# Patient Record
Sex: FEMALE | Race: BLACK OR AFRICAN AMERICAN | Employment: STUDENT | URBAN - METROPOLITAN AREA
[De-identification: names, ages, dates, MRNs, and addresses within clinical notes are randomized per-mention and may not be internally consistent; named-entity substitution may affect disease eponyms.]

---
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
## 2020-03-08 PROBLEM — R20.0 NUMBNESS AND TINGLING: Status: ACTIVE | Noted: 2020-03-08

## 2020-03-08 PROBLEM — R20.2 NUMBNESS AND TINGLING: Status: ACTIVE | Noted: 2020-03-08

## 2020-03-08 PROBLEM — M84.372A: Status: ACTIVE | Noted: 2020-03-08

## 2020-04-27 ENCOUNTER — HOSPITAL ENCOUNTER (OUTPATIENT)
Dept: SURGERY | Age: 20
Discharge: HOME | End: 2020-04-27

## 2020-04-29 VITALS — WEIGHT: 130 LBS | BODY MASS INDEX: 20.89 KG/M2 | HEIGHT: 66 IN

## 2020-04-29 RX ORDER — NORTRIPTYLINE HYDROCHLORIDE 25 MG/1
25 CAPSULE ORAL
COMMUNITY
End: 2020-07-09

## 2020-04-29 NOTE — PERIOP NOTES
Patient verified name&  . Order to obtain consent not currently found in EHR, however patient agrees with surgery posting. Pt verbalizes understanding of no visitor policy. Type 1B surgery,  assessment complete. Orders not yet received. Labs per surgeon: unknown  Labs per anesthesia protocol: none  EKG;s received from [address]; 19    Symptoms of tachycardia, irregular heart beat, seizures and dizziness. Pt saw cardiologist at [facility] 19. Pt was placed on cardiac monitor and had an ECHO with normal EF. Per Dr Jonatan Bazan notes, patient would be referred to neurologist. Pt and her mother state that they never received a referral. Patient also states hx of fever following 2 surgeries in [facility] 3 at [facility]. Pt states she was not instructed to be tested for MALIGNANT HYPERTHERMIA or told to be tested. Pt denies family hx of malignant hyperthermia. Pt 's mother states that now that she has been dx with Lupus by her GP that it is thought to be related. Left Msg requesting anesthesia records at [phone]. Discussed all of the above with Dr Eve Levi and received order for patient to be cleared by neurologist prior to surgery. Lisa Solis MA to Dr Lamont Scott and notified that pt must receive neurological clearance. Patient also notified. Patient answered medical/surgical history questions at their best of ability. All prior to admission medications reviewed with patient and documented in [facility]. Patient instructed to take ONLY THE FOLLOWING MEDICATIONS ON THE DAY OF SURGERY according to anesthesia guidelines with sips of water: none      Pt verbalizes understanding to [facility]: none    PT VERBALIZES UNDERSTANDING TO HOLD ALL VITAMINS AND SUPPLEMENTS 7 DAYS PRIOR TO SURGERY DATE (with exception to Renal Vitamins) and NSAIDS 5 DAYS PRIOR TO SURGERY DATE PER ANESTHESIA PROTOCOL.     Patient instructed on the following:  Arrive at [facility], time of arrival to be called the day before by 1700. NPO after midnight including gum, mints, tobacco and ice chips. Responsible adult must drive patient to the hospital, stay during surgery, and patient requires supervision 24 hours after anesthesia  Use antibacterial soap in shower the night before surgery and on the morning of surgery. Leave all valuables (money and jewelry) at home but bring insurance card and ID on DOS. Do not wear make-up, nail polish, lotions, cologne, perfumes, powders, or oil on skin. Patient teach back successful and patient demonstrates knowledge of instruction. If you do not receive a call with your arrival time by 5pm the business day prior to surgery, please call [phone].

## 2020-07-09 PROBLEM — M79.2 NEUROPATHIC PAIN: Status: ACTIVE | Noted: 2020-07-09

## 2020-07-09 PROBLEM — M21.372 FOOT DROP, LEFT FOOT: Status: ACTIVE | Noted: 2020-07-09

## 2020-07-09 PROBLEM — R53.1 WEAKNESS: Status: ACTIVE | Noted: 2020-07-09

## 2020-07-09 PROBLEM — Z79.899 ENCOUNTER FOR MEDICATION MANAGEMENT: Status: ACTIVE | Noted: 2020-07-09

## 2020-11-05 PROBLEM — M21.372 FOOT DROP, LEFT FOOT: Status: RESOLVED | Noted: 2020-07-09 | Resolved: 2020-11-05

## 2020-11-05 PROBLEM — R29.898 WEAKNESS OF FOOT, LEFT: Status: ACTIVE | Noted: 2020-07-09

## 2021-11-05 ENCOUNTER — HOSPITAL ENCOUNTER (OUTPATIENT)
Dept: PHYSICAL THERAPY | Age: 21
Discharge: HOME | End: 2021-11-05
Attending: ORTHOPAEDIC SURGERY
Payer: COMMERCIAL

## 2021-11-05 DIAGNOSIS — M54.32 SCIATICA OF LEFT SIDE: ICD-10-CM

## 2021-11-05 PROCEDURE — 97162 PT EVAL MOD COMPLEX 30 MIN: CPT

## 2021-11-05 NOTE — PROGRESS NOTES
León Casandra  : 2000  Payor: Katharine / Plan: [address] / Product Type: Managed Care Medicaid /  [address]  at   [address] [facility], [place] [address] [address] [facility]  [phone]   [phone]      OUTPATIENT PHYSICAL THERAPY: Daily Treatment Note 2021  Visit Count:  1    ICD-10: Treatment Diagnosis:   M54.5 Low Back Pain   M54.16 Lumbar Radiculopathy  R26.2 Difficulty in Walking, Not Elsewhere Classified  Z91.81 History of Falling     Precautions/Allergies:   Niacin and Penicillins     TREATMENT PLAN:  Effective Dates: 2021 TO 2/3/2022 (90 days). Frequency/Duration: 2 times a week for 90 Days      REASON FOR TREATMENT:  possible discogenic pain in lumbar spine that refers to L LE with extension preference. Neural tension testing and hypermobility testing positive, and pt will likely bennefit from physical therapy. Functional limitations reported at initial Eval: Bending down to pick things off the floor, stooping, lifting weights, R trunk rotation. Daily Note Subjective:     MEDICATIONS REVIEWED:  2021   TREATMENT:   (In addition to Assessment/Re-Assessment sessions the following treatments were rendered)    TREATMENT GRID: Exercises and activities per grid below to improve mobility, strength, balance, and overall function. Required minimal visual and verbal cues to promote proper body alignment and promote proper body posture. Progressed resistance, range and complexity of movement as indicated. Dx: LBP (and L LE) Date:  2021 Date:   Date:     Activity/Exercise Parameters Parameters Parameters                                                                 MANUAL THERAPY: Not performed apart from assessment. MODALITIES: Not performed. TREATMENT/SESSION ASSESSMENT:  León Guajardo verbalized understanding of role of PT and POC.     Education: Pt education for initial HEP safety, exercise frequency and duration, symptom control, mechanics, fall risk, and anatomy and physiology of present condition/healing time. RECOMMENDATIONS/INTENT FOR NEXT TREATMENT SESSION: Next visit will focus on advancements to more challenging activities. PAIN: Initial: 5/10 Post Session:  5/10         Total Treatment Billable Duration: Pt was billed for eval only this date.       Manual Therapy: (0 minutes)     Ther-Ex: (0 minutes)     Ther-Act: (0 minutes)     Neuro Re-ed (0 minutes)     Modalities: (0 minutes)  PT Patient Time In/Time Out  Time In: 1473  Time Out: 4823  Darrell Vickers PT, DPT    Future Appointments   Date Time Provider Cuca Ashley   12/8/2021 11:00 AM Gena Faustin MD [facility]   2/10/2022  3:00 PM Mike Meyers MD [address] [facility]       Visit Approval Visit # Therapist initials Date A NS / Cx < 24 hr >24 hr Cx Comments    1 RH 11/5/21 [x]  [] [] Initial evaluation       [] [] []        [] [] []        [] [] []        [] [] []        [] [] []        [] [] []        [] [] []        [] [] []        [] [] []        [] [] []        [] [] []        [] [] []        [] [] []        [] [] []        [] [] []        [] [] []        [] [] []
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
## 2022-03-02 ENCOUNTER — HOSPITAL ENCOUNTER (EMERGENCY)
Age: 22
Discharge: HOME | End: 2022-03-02
Attending: EMERGENCY MEDICINE
Payer: COMMERCIAL

## 2022-03-02 VITALS
OXYGEN SATURATION: 97 % | SYSTOLIC BLOOD PRESSURE: 104 MMHG | RESPIRATION RATE: 18 BRPM | HEIGHT: 66 IN | DIASTOLIC BLOOD PRESSURE: 67 MMHG | HEART RATE: 77 BPM | BODY MASS INDEX: 24.91 KG/M2 | WEIGHT: 155 LBS | TEMPERATURE: 98.4 F

## 2022-03-02 DIAGNOSIS — R56.9 SEIZURE-LIKE ACTIVITY (HCC): ICD-10-CM

## 2022-03-02 DIAGNOSIS — N30.00 ACUTE CYSTITIS WITHOUT HEMATURIA: Primary | ICD-10-CM

## 2022-03-02 LAB
ALBUMIN SERPL-MCNC: 3.7 G/DL (ref 3.5–5)
ALBUMIN/GLOB SERPL: 0.8 {RATIO} (ref 1.2–3.5)
ALP SERPL-CCNC: 82 U/L (ref 50–130)
ALT SERPL-CCNC: 29 U/L (ref 12–65)
ANION GAP SERPL CALC-SCNC: 8 MMOL/L (ref 7–16)
AST SERPL-CCNC: 15 U/L (ref 15–37)
ATRIAL RATE: 62 BPM
BACTERIA URNS QL MICRO: 0 /HPF
BASOPHILS # BLD: 0.1 K/UL (ref 0–0.2)
BASOPHILS NFR BLD: 0 % (ref 0–2)
BILIRUB SERPL-MCNC: 0.2 MG/DL (ref 0.2–1.1)
BUN SERPL-MCNC: 10 MG/DL (ref 6–23)
CALCIUM SERPL-MCNC: 9 MG/DL (ref 8.3–10.4)
CALCULATED P AXIS, ECG09: 41 DEGREES
CALCULATED R AXIS, ECG10: 68 DEGREES
CALCULATED T AXIS, ECG11: 61 DEGREES
CASTS URNS QL MICRO: NORMAL /LPF
CHLORIDE SERPL-SCNC: 103 MMOL/L (ref 98–107)
CO2 SERPL-SCNC: 26 MMOL/L (ref 21–32)
CREAT SERPL-MCNC: 0.78 MG/DL (ref 0.6–1)
DIAGNOSIS, 93000: NORMAL
DIFFERENTIAL METHOD BLD: ABNORMAL
EOSINOPHIL # BLD: 0 K/UL (ref 0–0.8)
EOSINOPHIL NFR BLD: 0 % (ref 0.5–7.8)
EPI CELLS #/AREA URNS HPF: NORMAL /HPF
ERYTHROCYTE [DISTWIDTH] IN BLOOD BY AUTOMATED COUNT: 12.3 % (ref 11.9–14.6)
GLOBULIN SER CALC-MCNC: 4.6 G/DL (ref 2.3–3.5)
GLUCOSE SERPL-MCNC: 117 MG/DL (ref 65–100)
HCG UR QL: NEGATIVE
HCT VFR BLD AUTO: 37.1 % (ref 35.8–46.3)
HGB BLD-MCNC: 12.4 G/DL (ref 11.7–15.4)
IMM GRANULOCYTES # BLD AUTO: 0.1 K/UL (ref 0–0.5)
IMM GRANULOCYTES NFR BLD AUTO: 0 % (ref 0–5)
LYMPHOCYTES # BLD: 1.8 K/UL (ref 0.5–4.6)
LYMPHOCYTES NFR BLD: 12 % (ref 13–44)
MAGNESIUM SERPL-MCNC: 2.2 MG/DL (ref 1.8–2.4)
MCH RBC QN AUTO: 28.9 PG (ref 26.1–32.9)
MCHC RBC AUTO-ENTMCNC: 33.4 G/DL (ref 31.4–35)
MCV RBC AUTO: 86.5 FL (ref 79.6–97.8)
MONOCYTES # BLD: 0.4 K/UL (ref 0.1–1.3)
MONOCYTES NFR BLD: 3 % (ref 4–12)
NEUTS SEG # BLD: 12.8 K/UL (ref 1.7–8.2)
NEUTS SEG NFR BLD: 85 % (ref 43–78)
NRBC # BLD: 0 K/UL (ref 0–0.2)
P-R INTERVAL, ECG05: 142 MS
PLATELET # BLD AUTO: 416 K/UL (ref 150–450)
PMV BLD AUTO: 9.4 FL (ref 9.4–12.3)
POTASSIUM SERPL-SCNC: 3.7 MMOL/L (ref 3.5–5.1)
PROT SERPL-MCNC: 8.3 G/DL (ref 6.3–8.2)
Q-T INTERVAL, ECG07: 406 MS
QRS DURATION, ECG06: 72 MS
QTC CALCULATION (BEZET), ECG08: 412 MS
RBC # BLD AUTO: 4.29 M/UL (ref 4.05–5.2)
RBC #/AREA URNS HPF: NORMAL /HPF
SODIUM SERPL-SCNC: 137 MMOL/L (ref 136–145)
VENTRICULAR RATE, ECG03: 62 BPM
WBC # BLD AUTO: 15.1 K/UL (ref 4.3–11.1)
WBC URNS QL MICRO: NORMAL /HPF

## 2022-03-02 PROCEDURE — 81003 URINALYSIS AUTO W/O SCOPE: CPT

## 2022-03-02 PROCEDURE — 85025 COMPLETE CBC W/AUTO DIFF WBC: CPT

## 2022-03-02 PROCEDURE — 83735 ASSAY OF MAGNESIUM: CPT

## 2022-03-02 PROCEDURE — 81015 MICROSCOPIC EXAM OF URINE: CPT

## 2022-03-02 PROCEDURE — 99284 EMERGENCY DEPT VISIT MOD MDM: CPT

## 2022-03-02 PROCEDURE — 93005 ELECTROCARDIOGRAM TRACING: CPT | Performed by: EMERGENCY MEDICINE

## 2022-03-02 PROCEDURE — 81025 URINE PREGNANCY TEST: CPT

## 2022-03-02 PROCEDURE — 80053 COMPREHEN METABOLIC PANEL: CPT

## 2022-03-02 RX ORDER — SODIUM CHLORIDE 0.9 % (FLUSH) 0.9 %
5-40 SYRINGE (ML) INJECTION AS NEEDED
Status: DISCONTINUED | OUTPATIENT
Start: 2022-03-02 | End: 2022-03-02 | Stop reason: HOSPADM

## 2022-03-02 RX ORDER — SODIUM CHLORIDE 0.9 % (FLUSH) 0.9 %
5-40 SYRINGE (ML) INJECTION EVERY 8 HOURS
Status: DISCONTINUED | OUTPATIENT
Start: 2022-03-02 | End: 2022-03-02 | Stop reason: HOSPADM

## 2022-03-02 RX ORDER — SULFAMETHOXAZOLE AND TRIMETHOPRIM 800; 160 MG/1; MG/1
1 TABLET ORAL 2 TIMES DAILY
Qty: 14 TABLET | Refills: 0 | Status: SHIPPED | OUTPATIENT
Start: 2022-03-02 | End: 2022-03-09

## 2022-03-02 NOTE — ED NOTES
I have reviewed discharge instructions with the patient. The patient verbalized understanding. Patient left ED via Discharge Method: ambulatory to Home with (girlfriend). Opportunity for questions and clarification provided. Patient given 1 scripts. To continue your aftercare when you leave the hospital, you may receive an automated call from our care team to check in on how you are doing. This is a free service and part of our promise to provide the best care and service to meet your aftercare needs.  If you have questions, or wish to unsubscribe from this service please call [phone]. Thank you for Choosing our [facility] Emergency Department.

## 2022-03-02 NOTE — DISCHARGE INSTRUCTIONS
Please return with any fevers, vomiting, difficulty breathing, worsening symptoms, or additional concerns. You can stop taking your other antibiotic and instead substitute the Bactrim. Please follow-up with your primary care doctor for further evaluation.

## 2022-03-02 NOTE — ED TRIAGE NOTES
Pt had multiple seizures starting @ 502, family states she had them back to back with only a few seconds between lasting for up to 20 minutes. Pt c/o head pain in the back of her head, family states she did not hit her head on anything. Pt has a hx of seizures, on Zonisamide  And states she has not missed a dose. Masked in triage.
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
## 2022-03-19 PROBLEM — M84.372A: Status: ACTIVE | Noted: 2020-03-08

## 2022-03-19 PROBLEM — R29.898 WEAKNESS OF FOOT, LEFT: Status: ACTIVE | Noted: 2020-07-09

## 2022-03-19 PROBLEM — R20.0 NUMBNESS AND TINGLING: Status: ACTIVE | Noted: 2020-03-08

## 2022-03-19 PROBLEM — R20.2 NUMBNESS AND TINGLING: Status: ACTIVE | Noted: 2020-03-08

## 2022-03-19 PROBLEM — Z79.899 ENCOUNTER FOR MEDICATION MANAGEMENT: Status: ACTIVE | Noted: 2020-07-09

## 2022-03-20 PROBLEM — M79.2 NEUROPATHIC PAIN: Status: ACTIVE | Noted: 2020-07-09

## 2022-06-09 ENCOUNTER — OFFICE VISIT (OUTPATIENT)
Dept: RHEUMATOLOGY | Age: 22
End: 2022-06-09
Payer: COMMERCIAL

## 2022-06-09 VITALS
RESPIRATION RATE: 16 BRPM | HEIGHT: 66 IN | SYSTOLIC BLOOD PRESSURE: 102 MMHG | HEART RATE: 76 BPM | WEIGHT: 165.3 LBS | BODY MASS INDEX: 26.57 KG/M2 | DIASTOLIC BLOOD PRESSURE: 64 MMHG

## 2022-06-09 DIAGNOSIS — M35.9 AUTOIMMUNE DISEASE (HCC): Primary | ICD-10-CM

## 2022-06-09 PROCEDURE — 99214 OFFICE O/P EST MOD 30 MIN: CPT | Performed by: INTERNAL MEDICINE

## 2022-06-09 RX ORDER — HYDROXYCHLOROQUINE SULFATE 200 MG/1
200 TABLET, FILM COATED ORAL 2 TIMES DAILY
Qty: 60 TABLET | Refills: 12 | Status: SHIPPED | OUTPATIENT
Start: 2022-06-09

## 2022-06-09 RX ORDER — PREDNISONE 10 MG/1
10 TABLET ORAL 2 TIMES DAILY
Qty: 60 TABLET | Refills: 0 | Status: SHIPPED | OUTPATIENT
Start: 2022-06-09 | End: 2022-07-28
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
## 2022-07-07 DIAGNOSIS — M35.9 AUTOIMMUNE DISEASE (HCC): Primary | ICD-10-CM

## 2022-07-07 RX ORDER — PREDNISONE 10 MG/1
TABLET ORAL
Qty: 60 TABLET | Refills: 0 | OUTPATIENT
Start: 2022-07-07

## 2022-07-08 ENCOUNTER — OFFICE VISIT (OUTPATIENT)
Dept: ORTHOPEDIC SURGERY | Age: 22
End: 2022-07-08

## 2022-07-08 DIAGNOSIS — S82.65XA CLOSED NONDISPLACED FRACTURE OF LATERAL MALLEOLUS OF LEFT FIBULA, INITIAL ENCOUNTER: Primary | ICD-10-CM

## 2022-07-08 DIAGNOSIS — M25.572 CHRONIC PAIN OF LEFT ANKLE: ICD-10-CM

## 2022-07-08 DIAGNOSIS — G89.29 CHRONIC PAIN OF LEFT ANKLE: ICD-10-CM

## 2022-07-08 PROCEDURE — 99213 OFFICE O/P EST LOW 20 MIN: CPT | Performed by: ORTHOPAEDIC SURGERY

## 2022-07-08 PROCEDURE — L4360 PNEUMAT WALKING BOOT PRE CST: HCPCS | Performed by: ORTHOPAEDIC SURGERY
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
## 2022-07-08 NOTE — PROGRESS NOTES
The patient was prescribed a walker boot for the patient's left foot. The patient wears a size 9 shoe and I fitted them with a M size boot. The patient was fitted and instructed on the use of prescribed walker boot. I explained how to fit themselves and that the plastic flexible piece should always be on the front of the boot and secured by the Velcro straps on top. The air bladder in the boot was adjusted according to proper fit and comfort. The patient walked a short distance and acknowledged satisfaction with current fit. I also explained that they need a heel lift or a higher heeled shoe for the uninvolved LE to help normalize gait and avoid excessive low back stress/strain due to leg length inequality created from walker boot. Patient read and signed documenting they understand and agree to [facility]'s current DME return policy.

## 2022-07-15 ENCOUNTER — TELEPHONE (OUTPATIENT)
Dept: ORTHOPEDIC SURGERY | Age: 22
End: 2022-07-15

## 2022-07-18 ENCOUNTER — TELEPHONE (OUTPATIENT)
Dept: ORTHOPEDIC SURGERY | Age: 22
End: 2022-07-18

## 2022-07-18 NOTE — TELEPHONE ENCOUNTER
Patient is calling back from Friday because her lt foot is in more pain than it was.  Wants to talk with someone

## 2022-07-19 ENCOUNTER — TELEPHONE (OUTPATIENT)
Dept: ORTHOPEDIC SURGERY | Age: 22
End: 2022-07-19

## 2022-07-19 NOTE — TELEPHONE ENCOUNTER
Spoke to patient and informed her per Dr. Roro Amaya she needs to wear her walker boot full time except for sleeping. Patient voiced understanding.

## 2022-07-28 ENCOUNTER — OFFICE VISIT (OUTPATIENT)
Dept: RHEUMATOLOGY | Age: 22
End: 2022-07-28
Payer: COMMERCIAL

## 2022-07-28 VITALS
RESPIRATION RATE: 14 BRPM | HEIGHT: 66 IN | BODY MASS INDEX: 26.9 KG/M2 | HEART RATE: 68 BPM | DIASTOLIC BLOOD PRESSURE: 60 MMHG | SYSTOLIC BLOOD PRESSURE: 92 MMHG | WEIGHT: 167.4 LBS

## 2022-07-28 DIAGNOSIS — M35.9 AUTOIMMUNE DISEASE (HCC): Primary | ICD-10-CM

## 2022-07-28 PROCEDURE — 99214 OFFICE O/P EST MOD 30 MIN: CPT | Performed by: INTERNAL MEDICINE

## 2022-07-28 RX ORDER — PREDNISONE 10 MG/1
10 TABLET ORAL DAILY
Qty: 30 TABLET | Refills: 1 | Status: SHIPPED | OUTPATIENT
Start: 2022-07-28
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
## 2022-08-19 ENCOUNTER — OFFICE VISIT (OUTPATIENT)
Dept: ORTHOPEDIC SURGERY | Age: 22
End: 2022-08-19
Payer: COMMERCIAL

## 2022-08-19 DIAGNOSIS — S82.65XA CLOSED NONDISPLACED FRACTURE OF LATERAL MALLEOLUS OF LEFT FIBULA, INITIAL ENCOUNTER: Primary | ICD-10-CM

## 2022-08-19 PROCEDURE — 99213 OFFICE O/P EST LOW 20 MIN: CPT | Performed by: ORTHOPAEDIC SURGERY

## 2022-08-19 NOTE — PROGRESS NOTES
Name: Maria Elena Hanson  YOB: 2000  Gender: female  MRN: [id number]    Summary:     Left healing lateral malleolus fracture     CC: Follow-up (Left ankle x-rays taken in office today)       HPI: Maria Elena Hanson is a 25 y.o. female who presents with Follow-up (Left ankle x-rays taken in office today)  . Patient presents back to the office today with some continued complaints of pain and swelling although overall better from wearing her walker boot. History was obtained by Patient     ROS/Meds/PSH/PMH/FH/SH: I personally reviewed the patients standard intake form. Below are the pertinents    Tobacco:  reports that she has never smoked. She has never used smokeless tobacco.  Diabetes: None      Physical Examination:  Exam of the left ankle shows a small swelling and tenderness to palpation over the medial and lateral malleolus. Imaging:   I independently interpreted XR taken today  Left ankle XR: AP, Lateral, Oblique views     ICD-10-CM    1. Closed nondisplaced fracture of lateral malleolus of left fibula, initial encounter  S82.65XA XR ANKLE LEFT (MIN 3 VIEWS)         Report: AP, lateral, oblique x-ray of the left ankle demonstrates healing fracture    Impression: Healing fracture   Kiah Lewis III, MD           Assessment:   Left healing fibular fracture    Treatment Plan:   3 This is an acute, uncomplicated illness/injury  Treatment at this time: Physical Therapy  Studies ordered: NO XR needed @ Next Visit    Weight-bearing status: WBAT        Return to work/work restrictions: none  No medications given    She can wean out of her walker boot and start a home exercise program as provided today and return in about 6 weeks if needed.

## 2022-09-15 ENCOUNTER — OFFICE VISIT (OUTPATIENT)
Dept: RHEUMATOLOGY | Age: 22
End: 2022-09-15
Payer: COMMERCIAL

## 2022-09-15 VITALS — SYSTOLIC BLOOD PRESSURE: 110 MMHG | WEIGHT: 165.2 LBS | DIASTOLIC BLOOD PRESSURE: 70 MMHG | BODY MASS INDEX: 26.66 KG/M2

## 2022-09-15 DIAGNOSIS — M35.9 AUTOIMMUNE DISEASE (HCC): Primary | ICD-10-CM

## 2022-09-15 LAB
ALBUMIN SERPL-MCNC: 4 G/DL (ref 3.5–5)
ALBUMIN/GLOB SERPL: 1 {RATIO} (ref 1.2–3.5)
ALP SERPL-CCNC: 62 U/L (ref 50–136)
ALT SERPL-CCNC: 14 U/L (ref 12–65)
ANION GAP SERPL CALC-SCNC: 6 MMOL/L (ref 4–13)
AST SERPL-CCNC: 16 U/L (ref 15–37)
BASOPHILS # BLD: 0 K/UL (ref 0–0.2)
BASOPHILS NFR BLD: 1 % (ref 0–2)
BILIRUB SERPL-MCNC: 0.6 MG/DL (ref 0.2–1.1)
BUN SERPL-MCNC: 8 MG/DL (ref 6–23)
CALCIUM SERPL-MCNC: 9.1 MG/DL (ref 8.3–10.4)
CHLORIDE SERPL-SCNC: 109 MMOL/L (ref 101–110)
CO2 SERPL-SCNC: 25 MMOL/L (ref 21–32)
CREAT SERPL-MCNC: 0.8 MG/DL (ref 0.6–1)
DIFFERENTIAL METHOD BLD: NORMAL
EOSINOPHIL # BLD: 0 K/UL (ref 0–0.8)
EOSINOPHIL NFR BLD: 1 % (ref 0.5–7.8)
ERYTHROCYTE [DISTWIDTH] IN BLOOD BY AUTOMATED COUNT: 13.1 % (ref 11.9–14.6)
GLOBULIN SER CALC-MCNC: 3.9 G/DL (ref 2.3–3.5)
GLUCOSE SERPL-MCNC: 93 MG/DL (ref 65–100)
HCT VFR BLD AUTO: 37.4 % (ref 35.8–46.3)
HGB BLD-MCNC: 12.3 G/DL (ref 11.7–15.4)
IMM GRANULOCYTES # BLD AUTO: 0 K/UL (ref 0–0.5)
IMM GRANULOCYTES NFR BLD AUTO: 0 % (ref 0–5)
LYMPHOCYTES # BLD: 1.2 K/UL (ref 0.5–4.6)
LYMPHOCYTES NFR BLD: 29 % (ref 13–44)
MCH RBC QN AUTO: 29.1 PG (ref 26.1–32.9)
MCHC RBC AUTO-ENTMCNC: 32.9 G/DL (ref 31.4–35)
MCV RBC AUTO: 88.6 FL (ref 79.6–97.8)
MONOCYTES # BLD: 0.3 K/UL (ref 0.1–1.3)
MONOCYTES NFR BLD: 7 % (ref 4–12)
NEUTS SEG # BLD: 2.8 K/UL (ref 1.7–8.2)
NEUTS SEG NFR BLD: 62 % (ref 43–78)
NRBC # BLD: 0 K/UL (ref 0–0.2)
PLATELET # BLD AUTO: 275 K/UL (ref 150–450)
PLATELET COMMENT: NORMAL
PMV BLD AUTO: 10.6 FL (ref 9.4–12.3)
POTASSIUM SERPL-SCNC: 3.9 MMOL/L (ref 3.5–5.1)
PROT SERPL-MCNC: 7.9 G/DL (ref 6.3–8.2)
RBC # BLD AUTO: 4.22 M/UL (ref 4.05–5.2)
RBC MORPH BLD: NORMAL
SODIUM SERPL-SCNC: 140 MMOL/L (ref 136–145)
WBC # BLD AUTO: 4.3 K/UL (ref 4.3–11.1)
WBC MORPH BLD: NORMAL

## 2022-09-15 PROCEDURE — 99214 OFFICE O/P EST MOD 30 MIN: CPT | Performed by: INTERNAL MEDICINE
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
## 2022-09-17 LAB
C3 SERPL-MCNC: 70 MG/DL (ref 82–167)
C4 SERPL-MCNC: <2 MG/DL (ref 12–38)
DSDNA AB SER-ACNC: 80 IU/ML (ref 0–9)

## 2022-10-05 ENCOUNTER — OFFICE VISIT (OUTPATIENT)
Dept: ORTHOPEDIC SURGERY | Age: 22
End: 2022-10-05
Payer: COMMERCIAL

## 2022-10-05 DIAGNOSIS — M84.372A STRESS FRACTURE OF LEFT ANKLE, INITIAL ENCOUNTER: Primary | ICD-10-CM

## 2022-10-05 PROCEDURE — 99213 OFFICE O/P EST LOW 20 MIN: CPT | Performed by: ORTHOPAEDIC SURGERY

## 2022-10-05 NOTE — PROGRESS NOTES
Name: Esther Manley  YOB: 2000  Gender: female  MRN: [id number]    Summary:   Left resolving ankle stress fracture       CC: Ankle Pain (Left ankle f/u)       HPI: Esther Manley is a 25 y.o. female who presents with Ankle Pain (Left ankle f/u)  . Patient returns back to the office today with continued complaints of left ankle pain but is gotten somewhat better. She is now been diagnosed with systemic lupus. She is on hydroxychloroquine for this now. History was obtained by Patient     ROS/Meds/PSH/PMH/FH/SH: I personally reviewed the patients standard intake form. Below are the pertinents    Tobacco:  reports that she has never smoked. She has never used smokeless tobacco.  Diabetes: None      Physical Examination:  Exam of the left foot and ankle shows diminished swelling and tenderness to palpation globally at the ankle joint. She has expected swelling. Imaging:   No imaging reviewed          Assessment:   Left resolving stress fracture of the ankle    Treatment Plan:   3 This is stable chronic illness/condition  Treatment at this time: Time with no intervention  Studies ordered: NO XR needed @ Next Visit    Weight-bearing status: WBAT        Return to work/work restrictions: none  No medications given    The pain appears to be back to her baseline now. The swelling is certainly better. I am optimistic that appropriate oral treatment for the lupus will also help some of her joint pain. She can return as needed.

## 2023-01-12 ENCOUNTER — OFFICE VISIT (OUTPATIENT)
Dept: RHEUMATOLOGY | Age: 23
End: 2023-01-12
Payer: COMMERCIAL

## 2023-01-12 VITALS
DIASTOLIC BLOOD PRESSURE: 82 MMHG | SYSTOLIC BLOOD PRESSURE: 118 MMHG | RESPIRATION RATE: 18 BRPM | BODY MASS INDEX: 27.48 KG/M2 | HEIGHT: 66 IN | HEART RATE: 88 BPM | WEIGHT: 171 LBS

## 2023-01-12 DIAGNOSIS — M35.9 AUTOIMMUNE DISEASE (HCC): Primary | ICD-10-CM

## 2023-01-12 PROCEDURE — 99214 OFFICE O/P EST MOD 30 MIN: CPT | Performed by: INTERNAL MEDICINE

## 2023-01-12 RX ORDER — GABAPENTIN 300 MG/1
900 CAPSULE ORAL 2 TIMES DAILY
COMMUNITY
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
## 2023-04-21 PROCEDURE — 96375 TX/PRO/DX INJ NEW DRUG ADDON: CPT

## 2023-04-21 PROCEDURE — 99284 EMERGENCY DEPT VISIT MOD MDM: CPT

## 2023-04-21 PROCEDURE — 96374 THER/PROPH/DIAG INJ IV PUSH: CPT

## 2023-04-22 ENCOUNTER — HOSPITAL ENCOUNTER (EMERGENCY)
Age: 23
Discharge: HOME | End: 2023-04-22
Attending: EMERGENCY MEDICINE
Payer: COMMERCIAL

## 2023-04-22 ENCOUNTER — APPOINTMENT (OUTPATIENT)
Dept: CT IMAGING | Age: 23
End: 2023-04-22
Payer: COMMERCIAL

## 2023-04-22 VITALS
SYSTOLIC BLOOD PRESSURE: 106 MMHG | OXYGEN SATURATION: 100 % | DIASTOLIC BLOOD PRESSURE: 71 MMHG | WEIGHT: 158 LBS | HEART RATE: 80 BPM | TEMPERATURE: 98 F | RESPIRATION RATE: 16 BRPM | BODY MASS INDEX: 25.39 KG/M2 | HEIGHT: 66 IN

## 2023-04-22 DIAGNOSIS — G43.109 MIGRAINE WITH AURA AND WITHOUT STATUS MIGRAINOSUS, NOT INTRACTABLE: Primary | ICD-10-CM

## 2023-04-22 PROCEDURE — 2580000003 HC RX 258: Performed by: EMERGENCY MEDICINE

## 2023-04-22 PROCEDURE — 70450 CT HEAD/BRAIN W/O DYE: CPT

## 2023-04-22 PROCEDURE — 6360000002 HC RX W HCPCS: Performed by: EMERGENCY MEDICINE

## 2023-04-22 RX ORDER — 0.9 % SODIUM CHLORIDE 0.9 %
1000 INTRAVENOUS SOLUTION INTRAVENOUS ONCE
Status: COMPLETED | OUTPATIENT
Start: 2023-04-22 | End: 2023-04-22

## 2023-04-22 RX ORDER — DIPHENHYDRAMINE HYDROCHLORIDE 50 MG/ML
25 INJECTION INTRAMUSCULAR; INTRAVENOUS
Status: COMPLETED | OUTPATIENT
Start: 2023-04-22 | End: 2023-04-22

## 2023-04-22 RX ORDER — DEXAMETHASONE SODIUM PHOSPHATE 10 MG/ML
10 INJECTION INTRAMUSCULAR; INTRAVENOUS
Status: COMPLETED | OUTPATIENT
Start: 2023-04-22 | End: 2023-04-22

## 2023-04-22 RX ORDER — KETOROLAC TROMETHAMINE 30 MG/ML
30 INJECTION, SOLUTION INTRAMUSCULAR; INTRAVENOUS ONCE
Status: COMPLETED | OUTPATIENT
Start: 2023-04-22 | End: 2023-04-22

## 2023-04-22 RX ORDER — PROCHLORPERAZINE EDISYLATE 5 MG/ML
10 INJECTION INTRAMUSCULAR; INTRAVENOUS
Status: COMPLETED | OUTPATIENT
Start: 2023-04-22 | End: 2023-04-22

## 2023-04-22 RX ADMIN — PROCHLORPERAZINE EDISYLATE 10 MG: 5 INJECTION INTRAMUSCULAR; INTRAVENOUS at 02:17

## 2023-04-22 RX ADMIN — DEXAMETHASONE SODIUM PHOSPHATE 10 MG: 10 INJECTION INTRAMUSCULAR; INTRAVENOUS at 02:18

## 2023-04-22 RX ADMIN — SODIUM CHLORIDE 1000 ML: 9 INJECTION, SOLUTION INTRAVENOUS at 02:22

## 2023-04-22 RX ADMIN — KETOROLAC TROMETHAMINE 30 MG: 30 INJECTION, SOLUTION INTRAMUSCULAR at 02:18

## 2023-04-22 RX ADMIN — DIPHENHYDRAMINE HYDROCHLORIDE 25 MG: 50 INJECTION, SOLUTION INTRAMUSCULAR; INTRAVENOUS at 02:17

## 2023-04-22 ASSESSMENT — PAIN DESCRIPTION - LOCATION
LOCATION: HEAD
LOCATION: HEAD

## 2023-04-22 ASSESSMENT — ENCOUNTER SYMPTOMS
PHOTOPHOBIA: 1
GASTROINTESTINAL NEGATIVE: 1
RESPIRATORY NEGATIVE: 1

## 2023-04-22 ASSESSMENT — PAIN SCALES - GENERAL
PAINLEVEL_OUTOF10: 10
PAINLEVEL_OUTOF10: 7

## 2023-04-22 ASSESSMENT — LIFESTYLE VARIABLES
HOW MANY STANDARD DRINKS CONTAINING ALCOHOL DO YOU HAVE ON A TYPICAL DAY: PATIENT DOES NOT DRINK
HOW OFTEN DO YOU HAVE A DRINK CONTAINING ALCOHOL: NEVER

## 2023-04-22 ASSESSMENT — PAIN - FUNCTIONAL ASSESSMENT: PAIN_FUNCTIONAL_ASSESSMENT: 0-10

## 2023-04-22 NOTE — ED TRIAGE NOTES
Pt ambulated to triage. Pt presenting with complaints of headaches, dizziness, and nausea. Pt denies vomiting. Pt states that she was coming to visit her mother and has developed an excruciating headache and is experiencing visual changes. Pt describes her visual changes as \"you know when you are looking at a really bright light and then look away and can still see a bright orb. \"     Breathing even and unlabored upon triage, no active signs of distress, vitals stable.

## 2023-04-22 NOTE — ED NOTES
I have reviewed discharge instructions with the patient. The patient verbalized understanding. Patient left ED via Discharge Method: ambulatory to Home with self/fam.   Opportunity for questions and clarification provided. Patient given 0 scripts. To continue your aftercare when you leave the hospital, you may receive an automated call from our care team to check in on how you are doing. This is a free service and part of our promise to provide the best care and service to meet your aftercare needs.  If you have questions, or wish to unsubscribe from this service please call [phone]. Thank you for Choosing our [facility] Emergency Department.         Dorean Boeck, RN  04/22/23 8172

## 2023-04-22 NOTE — ED PROVIDER NOTES
negative. Physical Exam     Vitals signs and nursing note reviewed:  Patient Vitals for the past 4 hrs:   Resp   04/22/23 0300 16          Physical Exam  Vitals and nursing note reviewed. Constitutional:       General: She is in acute distress (Appears uncomfortable). Appearance: Normal appearance. HENT:      Head: Normocephalic and atraumatic. Mouth/Throat:      Mouth: Mucous membranes are moist.   Eyes:      Extraocular Movements: Extraocular movements intact. Pupils: Pupils are equal, round, and reactive to light. Cardiovascular:      Rate and Rhythm: Normal rate and regular rhythm. Pulses: Normal pulses. Heart sounds: Normal heart sounds. Pulmonary:      Effort: Pulmonary effort is normal.      Breath sounds: Normal breath sounds. Abdominal:      Palpations: Abdomen is soft. Tenderness: There is no abdominal tenderness. There is no guarding or rebound. Musculoskeletal:         General: Normal range of motion. Cervical back: Normal range of motion. No tenderness. Skin:     General: Skin is warm and dry. Neurological:      Mental Status: She is alert and oriented to person, place, and time. Psychiatric:         Mood and Affect: Mood normal.         Behavior: Behavior normal.         Thought Content:  Thought content normal.         Judgment: Judgment normal.        Procedures     Procedures    Orders Placed This Encounter   Procedures    CT HEAD WO CONTRAST    POC PREGNANCY UR-QUAL    Saline lock IV        Medications   0.9 % sodium chloride bolus (0 mLs IntraVENous Stopped 4/22/23 0302)   ketorolac (TORADOL) injection 30 mg (30 mg IntraVENous Given 4/22/23 0218)   prochlorperazine (COMPAZINE) injection 10 mg (10 mg IntraVENous Given 4/22/23 0217)   diphenhydrAMINE (BENADRYL) injection 25 mg (25 mg IntraVENous Given 4/22/23 0217)   dexamethasone (DECADRON) injection 10 mg (10 mg IntraVENous Given 4/22/23 0218)       Discharge Medication List as of 4/22/2023

## 2023-05-11 ENCOUNTER — TELEPHONE (OUTPATIENT)
Dept: ORTHOPEDIC SURGERY | Age: 23
End: 2023-05-11

## 2023-05-11 NOTE — TELEPHONE ENCOUNTER
Spoke with pt she c/o of increased ankle pain so I made an appt for 5/15 @ 1:00 aware ES office and we can xray her left ankle at that time.  She will wear her boot until appt

## 2023-05-15 ENCOUNTER — OFFICE VISIT (OUTPATIENT)
Dept: ORTHOPEDIC SURGERY | Age: 23
End: 2023-05-15

## 2023-05-15 DIAGNOSIS — M19.079 ARTHRITIS OF SUBTALAR JOINT: ICD-10-CM

## 2023-05-15 DIAGNOSIS — M25.572 CHRONIC PAIN OF LEFT ANKLE: Primary | ICD-10-CM

## 2023-05-15 DIAGNOSIS — G89.29 CHRONIC PAIN OF LEFT ANKLE: Primary | ICD-10-CM

## 2023-05-15 RX ORDER — METHYLPREDNISOLONE 4 MG/1
TABLET ORAL
Qty: 1 KIT | Refills: 1 | Status: SHIPPED | OUTPATIENT
Start: 2023-05-15 | End: 2023-05-21

## 2023-05-15 RX ORDER — METHYLPREDNISOLONE ACETATE 40 MG/ML
40 INJECTION, SUSPENSION INTRA-ARTICULAR; INTRALESIONAL; INTRAMUSCULAR; SOFT TISSUE ONCE
Status: COMPLETED | OUTPATIENT
Start: 2023-05-15 | End: 2023-05-15

## 2023-05-15 RX ORDER — MELOXICAM 15 MG/1
15 TABLET ORAL DAILY
Qty: 30 TABLET | Refills: 3 | Status: SHIPPED | OUTPATIENT
Start: 2023-05-15

## 2023-05-15 RX ADMIN — METHYLPREDNISOLONE ACETATE 40 MG: 40 INJECTION, SUSPENSION INTRA-ARTICULAR; INTRALESIONAL; INTRAMUSCULAR; SOFT TISSUE at 13:29

## 2023-05-15 NOTE — PROGRESS NOTES
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 female who presents with Ankle Pain (Left ankle pain will xray today )  . Patient presents back the office today with a new onset of lateral sided ankle pain and stiffness. She is been doing relatively okay until lately when she had a long day on her at [facility]. She is been using her walker boot with with some effect. History was obtained by Patient     ROS/Meds/PSH/PMH/FH/SH: I personally reviewed the patients standard intake form. Below are the pertinents    Tobacco:  reports that she has never smoked. She has never used smokeless tobacco.  Diabetes: None      Physical Examination:    Exam of the left foot and ankle shows tenderness palpation and stiffness of the subtalar joint area in the sinus Tarsi. Imaging:   I independently interpreted XR taken today  Left ankle XR: AP, Lateral, Oblique views     ICD-10-CM    1. Chronic pain of left ankle  M25.572 XR ANKLE LEFT (MIN 3 VIEWS)    G89.29       2. Arthritis of subtalar joint  M19.079 methylPREDNISolone acetate (DEPO-MEDROL) injection 40 mg     DRAIN/INJECT INTERMEDIATE JOINT/BURSA         Report: AP, lateral, oblique x-ray of the left ankle demonstrates subtalar joint arthritis without fracture    Impression: Subtalar arthritis without fracture   Trinidad Romero III, MD           Assessment:   Left subtalar joint arthritis without fracture    Treatment Plan:   4 This is a chronic illness/condition with exacerbation and progression  Treatment at this time: Prescription Drug Management and Minor Procedure: Injection done today: The patient understands the risks and complications associated with injection.  After sterile prep of the area, the Left hindfoot joint was injected with 3 cc. of Xylocaine and 3 cc. of

## 2023-05-18 ENCOUNTER — OFFICE VISIT (OUTPATIENT)
Dept: RHEUMATOLOGY | Age: 23
End: 2023-05-18

## 2023-05-18 VITALS
RESPIRATION RATE: 16 BRPM | HEART RATE: 72 BPM | HEIGHT: 66 IN | DIASTOLIC BLOOD PRESSURE: 76 MMHG | SYSTOLIC BLOOD PRESSURE: 112 MMHG | WEIGHT: 155 LBS | BODY MASS INDEX: 24.91 KG/M2

## 2023-05-18 DIAGNOSIS — M35.9 AUTOIMMUNE DISEASE (HCC): ICD-10-CM

## 2023-05-18 DIAGNOSIS — M35.9 AUTOIMMUNE DISEASE (HCC): Primary | ICD-10-CM

## 2023-05-18 LAB
BASOPHILS # BLD: 0 K/UL (ref 0–0.2)
BASOPHILS NFR BLD: 0 % (ref 0–2)
DIFFERENTIAL METHOD BLD: ABNORMAL
EOSINOPHIL # BLD: 0 K/UL (ref 0–0.8)
EOSINOPHIL NFR BLD: 0 % (ref 0.5–7.8)
ERYTHROCYTE [DISTWIDTH] IN BLOOD BY AUTOMATED COUNT: 13.1 % (ref 11.9–14.6)
HCT VFR BLD AUTO: 33.7 % (ref 35.8–46.3)
HGB BLD-MCNC: 11.4 G/DL (ref 11.7–15.4)
IMM GRANULOCYTES # BLD AUTO: 0 K/UL (ref 0–0.5)
IMM GRANULOCYTES NFR BLD AUTO: 0 % (ref 0–5)
LYMPHOCYTES # BLD: 1.8 K/UL (ref 0.5–4.6)
LYMPHOCYTES NFR BLD: 27 % (ref 13–44)
MCH RBC QN AUTO: 29.8 PG (ref 26.1–32.9)
MCHC RBC AUTO-ENTMCNC: 33.8 G/DL (ref 31.4–35)
MCV RBC AUTO: 88 FL (ref 82–102)
MONOCYTES # BLD: 0.4 K/UL (ref 0.1–1.3)
MONOCYTES NFR BLD: 6 % (ref 4–12)
NEUTS SEG # BLD: 4.3 K/UL (ref 1.7–8.2)
NEUTS SEG NFR BLD: 67 % (ref 43–78)
NRBC # BLD: 0 K/UL (ref 0–0.2)
PLATELET # BLD AUTO: 211 K/UL (ref 150–450)
PMV BLD AUTO: 11.3 FL (ref 9.4–12.3)
RBC # BLD AUTO: 3.83 M/UL (ref 4.05–5.2)
WBC # BLD AUTO: 6.5 K/UL (ref 4.3–11.1)

## 2023-05-18 RX ORDER — HYDROXYCHLOROQUINE SULFATE 200 MG/1
200 TABLET, FILM COATED ORAL 2 TIMES DAILY
Qty: 60 TABLET | Refills: 12 | Status: SHIPPED | OUTPATIENT
Start: 2023-05-18
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
## 2023-05-19 LAB
ALBUMIN SERPL-MCNC: 4.1 G/DL (ref 3.5–5)
ALBUMIN/GLOB SERPL: 1.3 (ref 0.4–1.6)
ALP SERPL-CCNC: 42 U/L (ref 50–136)
ALT SERPL-CCNC: 15 U/L (ref 12–65)
ANION GAP SERPL CALC-SCNC: 6 MMOL/L (ref 2–11)
AST SERPL-CCNC: 12 U/L (ref 15–37)
BILIRUB SERPL-MCNC: 0.6 MG/DL (ref 0.2–1.1)
BUN SERPL-MCNC: 13 MG/DL (ref 6–23)
CALCIUM SERPL-MCNC: 8.8 MG/DL (ref 8.3–10.4)
CHLORIDE SERPL-SCNC: 110 MMOL/L (ref 101–110)
CO2 SERPL-SCNC: 24 MMOL/L (ref 21–32)
CREAT SERPL-MCNC: 0.7 MG/DL (ref 0.6–1)
GLOBULIN SER CALC-MCNC: 3.2 G/DL (ref 2.8–4.5)
GLUCOSE SERPL-MCNC: 109 MG/DL (ref 65–100)
POTASSIUM SERPL-SCNC: 3.2 MMOL/L (ref 3.5–5.1)
PROT SERPL-MCNC: 7.3 G/DL (ref 6.3–8.2)
SODIUM SERPL-SCNC: 140 MMOL/L (ref 133–143)

## 2023-05-23 LAB
ANA SPECKLED TITR SER: ABNORMAL {TITER}
ANA TITR SER IF: POSITIVE
NOTE: ABNORMAL

## 2023-05-25 ENCOUNTER — TELEPHONE (OUTPATIENT)
Dept: RHEUMATOLOGY | Age: 23
End: 2023-05-25

## 2023-05-25 NOTE — TELEPHONE ENCOUNTER
----- Message from Rayford Apley sent at 5/25/2023  1:03 PM EDT -----  Regarding: FW: Letter reply  Contact: [phone]    ----- Message -----  From: Aakash Viera  Sent: 5/25/2023   1:02 PM EDT  To: [facility] Clinical Staff  Subject: Letter reply                                     I received the letter for the dark tint but the name is incorrect. It says Ms. Farrkuh Atif not Ms. Wilma Manzano. Didnt want that to be a issue if I were pulled over and showed them the letter.

## 2023-06-28 ENCOUNTER — APPOINTMENT (OUTPATIENT)
Dept: ULTRASOUND IMAGING | Age: 23
End: 2023-06-28
Payer: COMMERCIAL

## 2023-06-28 ENCOUNTER — HOSPITAL ENCOUNTER (EMERGENCY)
Age: 23
Discharge: HOME | End: 2023-06-28
Attending: EMERGENCY MEDICINE
Payer: COMMERCIAL

## 2023-06-28 VITALS
TEMPERATURE: 98.4 F | RESPIRATION RATE: 16 BRPM | SYSTOLIC BLOOD PRESSURE: 102 MMHG | DIASTOLIC BLOOD PRESSURE: 69 MMHG | HEART RATE: 79 BPM | OXYGEN SATURATION: 98 % | WEIGHT: 150 LBS | BODY MASS INDEX: 24.21 KG/M2

## 2023-06-28 DIAGNOSIS — N93.8 DYSFUNCTIONAL UTERINE BLEEDING: Primary | ICD-10-CM

## 2023-06-28 LAB
ANION GAP SERPL CALC-SCNC: 3 MMOL/L (ref 2–11)
BUN SERPL-MCNC: 10 MG/DL (ref 6–23)
CALCIUM SERPL-MCNC: 9.2 MG/DL (ref 8.3–10.4)
CHLORIDE SERPL-SCNC: 110 MMOL/L (ref 101–110)
CO2 SERPL-SCNC: 27 MMOL/L (ref 21–32)
CREAT SERPL-MCNC: 0.94 MG/DL (ref 0.6–1)
ERYTHROCYTE [DISTWIDTH] IN BLOOD BY AUTOMATED COUNT: 12.7 % (ref 11.9–14.6)
GLUCOSE SERPL-MCNC: 99 MG/DL (ref 65–100)
HCG, URINE, POC: NEGATIVE
HCT VFR BLD AUTO: 34.8 % (ref 35.8–46.3)
HGB BLD-MCNC: 11.8 G/DL (ref 11.7–15.4)
Lab: NORMAL
MCH RBC QN AUTO: 28.9 PG (ref 26.1–32.9)
MCHC RBC AUTO-ENTMCNC: 33.9 G/DL (ref 31.4–35)
MCV RBC AUTO: 85.1 FL (ref 82–102)
NEGATIVE QC PASS/FAIL: NORMAL
NRBC # BLD: 0 K/UL (ref 0–0.2)
PLATELET # BLD AUTO: 202 K/UL (ref 150–450)
PMV BLD AUTO: 10.3 FL (ref 9.4–12.3)
POSITIVE QC PASS/FAIL: NORMAL
POTASSIUM SERPL-SCNC: 3.3 MMOL/L (ref 3.5–5.1)
RBC # BLD AUTO: 4.09 M/UL (ref 4.05–5.2)
SODIUM SERPL-SCNC: 140 MMOL/L (ref 133–143)
WBC # BLD AUTO: 6 K/UL (ref 4.3–11.1)

## 2023-06-28 PROCEDURE — 6370000000 HC RX 637 (ALT 250 FOR IP): Performed by: NURSE PRACTITIONER

## 2023-06-28 PROCEDURE — 85027 COMPLETE CBC AUTOMATED: CPT

## 2023-06-28 PROCEDURE — 99284 EMERGENCY DEPT VISIT MOD MDM: CPT

## 2023-06-28 PROCEDURE — 76856 US EXAM PELVIC COMPLETE: CPT

## 2023-06-28 PROCEDURE — 80048 BASIC METABOLIC PNL TOTAL CA: CPT

## 2023-06-28 RX ORDER — ACETAMINOPHEN 500 MG
1000 TABLET ORAL
Status: COMPLETED | OUTPATIENT
Start: 2023-06-28 | End: 2023-06-28

## 2023-06-28 RX ADMIN — ACETAMINOPHEN 1000 MG: 500 TABLET, FILM COATED ORAL at 23:39

## 2023-06-28 ASSESSMENT — PAIN SCALES - GENERAL: PAINLEVEL_OUTOF10: 9

## 2023-06-28 ASSESSMENT — PAIN - FUNCTIONAL ASSESSMENT: PAIN_FUNCTIONAL_ASSESSMENT: 0-10

## 2023-06-28 ASSESSMENT — LIFESTYLE VARIABLES
HOW MANY STANDARD DRINKS CONTAINING ALCOHOL DO YOU HAVE ON A TYPICAL DAY: 1 OR 2
HOW OFTEN DO YOU HAVE A DRINK CONTAINING ALCOHOL: 2-3 TIMES A WEEK

## 2023-06-28 ASSESSMENT — ENCOUNTER SYMPTOMS
ABDOMINAL PAIN: 0
SHORTNESS OF BREATH: 0

## 2023-07-12 ENCOUNTER — TELEPHONE (OUTPATIENT)
Dept: ORTHOPEDIC SURGERY | Age: 23
End: 2023-07-12

## 2023-07-29 ENCOUNTER — HOSPITAL ENCOUNTER (EMERGENCY)
Age: 23
Discharge: HOME | End: 2023-07-29
Attending: EMERGENCY MEDICINE
Payer: COMMERCIAL

## 2023-07-29 VITALS
TEMPERATURE: 98.1 F | HEART RATE: 69 BPM | HEIGHT: 66 IN | DIASTOLIC BLOOD PRESSURE: 77 MMHG | BODY MASS INDEX: 23.3 KG/M2 | SYSTOLIC BLOOD PRESSURE: 128 MMHG | RESPIRATION RATE: 16 BRPM | WEIGHT: 145 LBS | OXYGEN SATURATION: 98 %

## 2023-07-29 DIAGNOSIS — M32.9 LUPUS (HCC): Primary | ICD-10-CM

## 2023-07-29 DIAGNOSIS — N63.21 MASS OF UPPER OUTER QUADRANT OF LEFT BREAST: ICD-10-CM

## 2023-07-29 PROCEDURE — 99283 EMERGENCY DEPT VISIT LOW MDM: CPT

## 2023-07-29 PROCEDURE — 6370000000 HC RX 637 (ALT 250 FOR IP): Performed by: PHYSICIAN ASSISTANT

## 2023-07-29 RX ORDER — DIPHENHYDRAMINE HCL 25 MG
25 CAPSULE ORAL
Status: COMPLETED | OUTPATIENT
Start: 2023-07-29 | End: 2023-07-29

## 2023-07-29 RX ORDER — HYDROXYZINE PAMOATE 25 MG/1
25 CAPSULE ORAL
Status: DISCONTINUED | OUTPATIENT
Start: 2023-07-29 | End: 2023-07-29

## 2023-07-29 RX ORDER — METHYLPREDNISOLONE 4 MG/1
TABLET ORAL
Qty: 1 KIT | Refills: 0 | Status: SHIPPED | OUTPATIENT
Start: 2023-07-29 | End: 2023-08-04

## 2023-07-29 RX ADMIN — DIPHENHYDRAMINE HYDROCHLORIDE 25 MG: 25 CAPSULE ORAL at 16:32

## 2023-07-29 ASSESSMENT — PAIN - FUNCTIONAL ASSESSMENT: PAIN_FUNCTIONAL_ASSESSMENT: 0-10

## 2023-07-29 ASSESSMENT — PAIN DESCRIPTION - LOCATION: LOCATION: GENERALIZED;BREAST

## 2023-07-29 ASSESSMENT — PAIN SCALES - GENERAL: PAINLEVEL_OUTOF10: 10

## 2023-07-29 NOTE — DISCHARGE INSTRUCTIONS
Call your OB/GYN doctor to schedule a follow-up visit to ensure resolution of the breast mass. As discussed this may be a simple cyst related to your menstrual cycle but needs to be evaluated to ensure its nothing more serious. Take steroids as prescribed. Follow-up with your rheumatologist and your primary care provider. Follow-up with your PCP in 1 to 2 days if no improvement. Return to the ER for any new or worsening symptoms.

## 2023-07-29 NOTE — ED TRIAGE NOTES
Patient advises that she has lupus and believes she is having a flare up with all over body pain swelling occurring to bilateral hands and face. Patient advises that she also noted a \"knot\" to left breast couple weeks ago and now bigger and causing pain.

## 2023-07-29 NOTE — ED PROVIDER NOTES
Emergency Department Provider Note       PCP: Yulisa Garcia MD   Age: 21 y.o. Sex: female     DISPOSITION Decision To Discharge 07/29/2023 03:33:39 PM       ICD-10-CM    1. Lupus ([address])  M32.9       2. Mass of upper outer quadrant of left breast  N63.21           Medical Decision Making     Complexity of Problems Addressed:  Complexity of Problem: 1 acute, uncomplicated illness or injury. Data Reviewed and Analyzed:  Category 1:   I independently ordered and reviewed each unique test.  I reviewed external records: provider visit note from outside specialist.         Category 3: Discussion of management or test interpretation. 77-year-old female presenting to the emergency department today for complaints of lupus flare noting rash and some mild swelling to bilateral face and joints of the hand. Feels like a typical flare. Vital signs stable, she has not had any fevers or recent illness. Typically is relieved with Medrol Dosepak which I will prescribe for her today. Also unrelated complaint of a left breast mass. With chaperone present the mass was examined, there is a small mobile nodule that is tender. There is no overlying skin changes. I doubt abscess or mastitis. She is menstruating, could be a cyst, but we discussed that ultimately she needs to follow-up with her OB/GYN as may benefit from outpatient ultrasound or mammogram.  Low risk for breast cancer but certainly a consideration. She feels comfortable with this discharge plan. Risk of Complications and/or Morbidity of Patient Management:  Shared medical decision making was utilized in creating the patients health plan today. History     Carlyn Brito is a 21 y.o. female who presents to the Emergency Department with chief complaint of    Chief Complaint   Patient presents with    Breast Pain    Pain      77-year-old female presenting today for concern for a lupus flare.   She states that she has noticed an erythematous rash on

## 2023-08-02 ENCOUNTER — OFFICE VISIT (OUTPATIENT)
Dept: ORTHOPEDIC SURGERY | Age: 23
End: 2023-08-02
Payer: COMMERCIAL

## 2023-08-02 DIAGNOSIS — M19.079 ARTHRITIS OF SUBTALAR JOINT: Primary | ICD-10-CM

## 2023-08-02 PROCEDURE — 99214 OFFICE O/P EST MOD 30 MIN: CPT | Performed by: ORTHOPAEDIC SURGERY

## 2023-08-02 RX ORDER — TIZANIDINE 2 MG/1
TABLET ORAL
COMMUNITY
Start: 2023-07-21

## 2023-08-02 RX ORDER — PREDNISONE 10 MG/1
TABLET ORAL
COMMUNITY
Start: 2021-06-17

## 2023-08-02 RX ORDER — ALBUTEROL SULFATE 2.5 MG/3ML
2.5 SOLUTION RESPIRATORY (INHALATION)
COMMUNITY
Start: 2016-05-19

## 2023-08-02 RX ORDER — METHYLPREDNISOLONE 4 MG/1
TABLET ORAL
COMMUNITY
Start: 2023-05-15 | End: 2023-08-02

## 2023-08-02 RX ORDER — DICLOFENAC SODIUM 25 MG/1
TABLET, DELAYED RELEASE ORAL
COMMUNITY
Start: 2023-07-21

## 2023-08-02 NOTE — PROGRESS NOTES
Name: Axel Schneider  YOB: 2000  Gender: female  MRN: [id number]    Summary:   Left planovalgus hindfoot with subtalar arthritis       CC: Follow-up (Left ankle pain. No X-rays taken in office today.)       HPI: Axel Schneider is a 21 y.o. female who presents with Follow-up (Left ankle pain. No X-rays taken in office today.)  . This patient returns back to the office today for follow-up of her left planovalgus hindfoot with subtalar joint arthritis. She got about 3 weeks of relief at the left subtalar joint injection we performed in the office. She had persistent and continued pain in the left subtalar joint with ongoing stiffness. History was obtained by Patient     ROS/Meds/PSH/PMH/FH/SH: I personally reviewed the patients standard intake form. Below are the pertinents    Tobacco:  reports that she has never smoked. She has never used smokeless tobacco.  Diabetes: None      Physical Examination:    On exam she has very little swelling but she has significant palpation tenderness to palpation of the sinus Tarsi as well as stiffness in the subtalar joint with pain. The tibiotalar joint has reasonable range of motion. She has palpable pulses and intact sensation. 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 I recommended a CT scan of the left ankle to evaluate the subtalar joint for the magnitude of arthritis. We discussed potential Achilles intending with subtalar joint arthrodesis based on the result.   She is not really on any

## 2023-08-09 ENCOUNTER — HOSPITAL ENCOUNTER (OUTPATIENT)
Dept: CT IMAGING | Age: 23
Discharge: HOME | End: 2023-08-12
Attending: ORTHOPAEDIC SURGERY
Payer: COMMERCIAL

## 2023-08-09 DIAGNOSIS — M19.079 ARTHRITIS OF SUBTALAR JOINT: ICD-10-CM

## 2023-08-09 PROCEDURE — 73700 CT LOWER EXTREMITY W/O DYE: CPT

## 2023-08-11 ENCOUNTER — TELEPHONE (OUTPATIENT)
Dept: ORTHOPEDIC SURGERY | Age: 23
End: 2023-08-11

## 2023-08-11 NOTE — TELEPHONE ENCOUNTER
Sent [facility] to patient Dr. Delta Dos Santos will call her with her CT results.

## 2023-08-11 NOTE — TELEPHONE ENCOUNTER
She sent a message through [facility] but didn't hear back so she wanted to let you know that she completed her CT scan. Please call.

## 2023-08-24 ENCOUNTER — TELEPHONE (OUTPATIENT)
Dept: ORTHOPEDIC SURGERY | Age: 23
End: 2023-08-24

## 2023-08-24 NOTE — TELEPHONE ENCOUNTER
I reviewed this patient's CT of the ankle. I called and discussed results with her. She would not have much arthritis to speak of the subtalar joint it would warrant a fusion. She will get about 1 day of relief from the last injection as well. Discussed potential referral to pain management for further consideration of treatment and she would like to proceed with this. We will arrange this for her.

## 2023-08-25 DIAGNOSIS — S82.65XA CLOSED NONDISPLACED FRACTURE OF LATERAL MALLEOLUS OF LEFT FIBULA, INITIAL ENCOUNTER: ICD-10-CM

## 2023-08-25 DIAGNOSIS — M19.079 ARTHRITIS OF SUBTALAR JOINT: Primary | ICD-10-CM

## 2023-08-25 DIAGNOSIS — G89.29 CHRONIC PAIN OF LEFT ANKLE: ICD-10-CM

## 2023-08-25 DIAGNOSIS — M25.572 CHRONIC PAIN OF LEFT ANKLE: ICD-10-CM

## 2023-11-16 ENCOUNTER — OFFICE VISIT (OUTPATIENT)
Dept: RHEUMATOLOGY | Age: 23
End: 2023-11-16
Payer: COMMERCIAL

## 2023-11-16 VITALS
DIASTOLIC BLOOD PRESSURE: 68 MMHG | SYSTOLIC BLOOD PRESSURE: 112 MMHG | WEIGHT: 135 LBS | BODY MASS INDEX: 21.69 KG/M2 | HEART RATE: 84 BPM | RESPIRATION RATE: 18 BRPM | HEIGHT: 66 IN

## 2023-11-16 DIAGNOSIS — R63.4 WEIGHT LOSS, UNINTENTIONAL: ICD-10-CM

## 2023-11-16 DIAGNOSIS — R68.81 EARLY SATIETY: ICD-10-CM

## 2023-11-16 DIAGNOSIS — M35.9 AUTOIMMUNE DISEASE (HCC): Primary | ICD-10-CM

## 2023-11-16 PROCEDURE — 99214 OFFICE O/P EST MOD 30 MIN: CPT | Performed by: INTERNAL MEDICINE

## 2023-11-16 RX ORDER — METHYLPREDNISOLONE 4 MG/1
TABLET ORAL
Qty: 1 KIT | Refills: 1 | Status: SHIPPED | OUTPATIENT
Start: 2023-11-16 | End: 2023-11-16 | Stop reason: CLARIF

## 2023-11-16 RX ORDER — METHYLPREDNISOLONE 4 MG/1
TABLET ORAL
Qty: 1 KIT | Refills: 1 | Status: SHIPPED | OUTPATIENT
Start: 2023-11-16 | End: 2023-11-22

## 2023-11-16 RX ORDER — METHYLPREDNISOLONE ACETATE 80 MG/ML
80 INJECTION, SUSPENSION INTRA-ARTICULAR; INTRALESIONAL; INTRAMUSCULAR; SOFT TISSUE ONCE
Status: COMPLETED | OUTPATIENT
Start: 2023-11-16 | End: 2023-11-16

## 2023-11-16 RX ADMIN — METHYLPREDNISOLONE ACETATE 80 MG: 80 INJECTION, SUSPENSION INTRA-ARTICULAR; INTRALESIONAL; INTRAMUSCULAR; SOFT TISSUE at 15:37

## 2024-01-18 DIAGNOSIS — M79.2 NEUROPATHIC PAIN: ICD-10-CM

## 2024-01-18 DIAGNOSIS — M35.9 AUTOIMMUNE DISEASE (HCC): Primary | ICD-10-CM

## 2024-01-18 RX ORDER — METHYLPREDNISOLONE 4 MG/1
TABLET ORAL
Qty: 1 KIT | Refills: 0 | Status: SHIPPED | OUTPATIENT
Start: 2024-01-18 | End: 2024-01-24

## 2024-01-18 NOTE — TELEPHONE ENCOUNTER
Pt called with c/o increasing pain, difficulty sleeping/staying asleep and needing help to use stairs, unable to walk her dog due to change in level of pain and stiffness.  She purchased a new bed, tempurpedic, which did not give her any relief and she stays awake most nights in pain and it is difficult to get out of bed for most of the day.  She has been at her brother in law's home and he has been helping her.    She is still compliant with hcq 200mg bid and diclofenac 75mg. She still has rashes on face and is still losing weight and does not think the hcq is helping.    Pending medrol dose pack and advised pt, will move up appt prn; pt to update once completed.

## 2024-02-01 ENCOUNTER — TELEPHONE (OUTPATIENT)
Dept: RHEUMATOLOGY | Age: 24
End: 2024-02-01

## 2024-02-01 NOTE — TELEPHONE ENCOUNTER
----- Message from Sarbjit Goel MD sent at 2/1/2024  8:54 AM EST -----  Regarding: RE: GI referral  Contact: [phone]  If she is having a severe headache she probably needs to see her primary care or somebody soon as possible  ----- Message -----  From: Debby Mejia MA  Sent: 1/30/2024   1:13 PM EST  To: Sarbjit Goel MD  Subject: FW: GI referral                                    ----- Message -----  From: Carlyn Crystal  Sent: 1/30/2024  12:00 PM EST  To: Debby Mejia MA  Subject: GI referral                                      Yes I’ve called and set the appointment for Feb 8th. I’ve been calling to try to speak with you but I think I miss you when you’re around the phone. The dose pack helped but two days after I finished it I was back feeling the same symptoms. I’m not sure what do anymore. I spend more days crying than anything because I’m in pain all over. My head feels like it’s gonna explode and laying down makes everything worse. I haven’t been taking the hydroxychloroquine only because when I was taking it as directed I wasn’t feel any better and I know the medication can build up behind your eye socket. Life has become miserable for me and I’ve never felt this bad before. Please help.

## 2024-02-01 NOTE — TELEPHONE ENCOUNTER
Delaney and relayed MD message, emphasized importance of going for eval/tx asap.  Pt did not go prior to today, said she stayed home and kept taking other meds.  Says she is not any better.  Pt will get mother to take her either to pcp or UC/ED and will update.

## 2024-05-10 ENCOUNTER — OFFICE VISIT (OUTPATIENT)
Dept: ORTHOPEDIC SURGERY | Age: 24
End: 2024-05-10

## 2024-05-10 DIAGNOSIS — M25.572 LEFT ANKLE PAIN, UNSPECIFIED CHRONICITY: Primary | ICD-10-CM

## 2024-05-10 DIAGNOSIS — S90.02XA CONTUSION OF LEFT ANKLE, INITIAL ENCOUNTER: ICD-10-CM

## 2024-05-10 NOTE — PROGRESS NOTES
Name: Carlyn Crystal  YOB: 2000  Gender: female  MRN: [id number]    Summary:   Left lateral malleolus contusion       CC: New Patient (Left ankle /XR obtained in office)       HPI: Carlyn Crystal is a 23 y.o. female who presents with New Patient (Left ankle /XR obtained in office)  .  This patient presents the office today after hitting her left side of her left ankle on a hard bed frame with acute onset of pain.  Of note she has had worsening lupus lately and has had to be hospitalized because of some kidney issues secondary to lupus and has been on steroid medication for it.    History was obtained by Patient     ROS/Meds/PSH/PMH/FH/SH: I personally reviewed the patients standard intake form.  Below are the pertinents    Tobacco:  reports that she has never smoked. She has never used smokeless tobacco.  Diabetes: None      Physical Examination:  Exam of the left lower extremity shows acute tenderness to palpation over the distal fibula with no evidence of swelling.      Imaging:   Interpretation of imaging  Left ankle XR: AP, Lateral, Oblique views     ICD-10-CM    1. Left ankle pain, unspecified chronicity  M25.572 XR ANKLE LEFT (MIN 3 VIEWS)      2. Contusion of left ankle, initial encounter  S90.02XA          Report: AP, lateral, oblique x-ray of the left ankle demonstrates no definite new fracture    Impression: No definite new fracture   CRISTINA MOORE III, MD           Assessment:   Left distal ankle distal fibular contusion    Treatment Plan:   4 This is a chronic illness/condition with exacerbation and progression  Treatment at this time: Bracing: Placed in: 3D boot  Studies ordered: NO XR needed @ Next Visit    Weight-bearing status: WBAT        Return to work/work restrictions: none  No medications given    She is placed in a walker boot today weightbearing as tolerated return in 6 weeks.

## 2024-05-10 NOTE — PROGRESS NOTES
The patient was prescribed a walker boot for the patient's left foot. The patient wears a size 9 shoe and I fitted them with a M size boot. The patient was fitted and instructed on the use of prescribed walker boot. I explained how to fit themselves and that the plastic flexible piece should always be on the front of the boot and secured by the Velcro straps on top. The air bladder in the boot was adjusted according to proper fit and comfort. The patient walked a short distance and acknowledged satisfaction with current fit. I also explained that they need a heel lift or a higher heeled shoe for the uninvolved LE to help normalize gait and avoid excessive low back stress/strain due to leg length inequality created from walker boot.Patient read and signed documenting they understand and agree to [facility]'s current DME return policy.

## 2024-08-14 ENCOUNTER — OFFICE VISIT (OUTPATIENT)
Dept: ORTHOPEDIC SURGERY | Age: 24
End: 2024-08-14
Payer: COMMERCIAL

## 2024-08-14 DIAGNOSIS — M84.372A STRESS FRACTURE OF LEFT ANKLE, INITIAL ENCOUNTER: Primary | ICD-10-CM

## 2024-08-14 PROCEDURE — 99214 OFFICE O/P EST MOD 30 MIN: CPT | Performed by: ORTHOPAEDIC SURGERY

## 2024-08-14 NOTE — PROGRESS NOTES
Name: Carlyn Crystal  YOB: 2000  Gender: female  MRN: [id number]    Summary:   Left leg contusion with probable stress fracture of the ankle       CC: Follow-up (Left lateral malleolus contusion)       HPI: Carlyn Crystal is a 24 y.o. female who presents with Follow-up (Left lateral malleolus contusion)  .  This patient presents back to the office today with continued complaints of global ankle pain on the left.  She is not had any recent trauma to it but the pain is gotten worse in other parts of her ankle.  Of note she is now on high-dose prednisone to treat her lupus which is gotten somewhat better under control.  She is also had some rib pain lately and had some x-rays performed of her ribs by the rheumatologist who informed her that the bone density may be affected by the high levels of prednisone that she is on.    History was obtained by Patient     ROS/Meds/PSH/PMH/FH/SH: I personally reviewed the patients standard intake form.  Below are the pertinents    Tobacco:  reports that she has never smoked. She has never used smokeless tobacco.  Diabetes: None      Physical Examination:  Exam of the left foot and ankle shows stiffness of the ankle joint with limited range of motion of the tibiotalar and subtalar joints.  There are some tenderness palpation of the distal fibula as well.  She has palpable pulses and intact sensation.  No sign of DVT or infection is noted.      Imaging:   No imaging reviewed           CRISTINA MOORE III, MD           Assessment:   Left probable ankle stress fracture with high-dose prednisone use and history of lupus    Treatment Plan:   4 This is a chronic illness/condition with exacerbation and progression  Treatment at this time: Time with no intervention  Studies ordered: Ankle XR needed @ Next Visit    Weight-bearing status: WBAT        Return to work/work restrictions: none  No medications given    I think her abnormal mechanics caused by the distal syndesmosis

## 2024-08-19 ENCOUNTER — INITIAL CONSULT (OUTPATIENT)
Age: 24
End: 2024-08-19
Payer: COMMERCIAL

## 2024-08-19 VITALS
HEART RATE: 70 BPM | HEIGHT: 66 IN | BODY MASS INDEX: 24.43 KG/M2 | WEIGHT: 152 LBS | DIASTOLIC BLOOD PRESSURE: 80 MMHG | SYSTOLIC BLOOD PRESSURE: 130 MMHG

## 2024-08-19 DIAGNOSIS — Z76.89 ENCOUNTER TO ESTABLISH CARE: Primary | ICD-10-CM

## 2024-08-19 DIAGNOSIS — R07.89 OTHER CHEST PAIN: ICD-10-CM

## 2024-08-19 DIAGNOSIS — R20.0 NUMBNESS AND TINGLING: ICD-10-CM

## 2024-08-19 DIAGNOSIS — R20.2 NUMBNESS AND TINGLING: ICD-10-CM

## 2024-08-19 PROCEDURE — 99204 OFFICE O/P NEW MOD 45 MIN: CPT | Performed by: INTERNAL MEDICINE

## 2024-08-19 PROCEDURE — 93000 ELECTROCARDIOGRAM COMPLETE: CPT | Performed by: INTERNAL MEDICINE

## 2024-08-19 ASSESSMENT — ENCOUNTER SYMPTOMS
DOUBLE VISION: 0
ORTHOPNEA: 0
HEMOPTYSIS: 0
BLOATING: 0
NAUSEA: 0
VOMITING: 0
ABDOMINAL PAIN: 0
SHORTNESS OF BREATH: 0
BLURRED VISION: 0
BACK PAIN: 0
COUGH: 0

## 2024-08-19 NOTE — PROGRESS NOTES
[facility] CARDIOLOGY  [address] [place] [address], [address]  [place] [address]  PHONE: [phone]    24    NAME:  Carlyn Crystal  : 2000  MRN: [id number]         SUBJECTIVE:   Carlyn Crystal is a 24 y.o. female seen for a visit regarding the following:     Chief Complaint   Patient presents with    Consultation    Dizziness       HPI:      No prior cardiac history.  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  Echo noted from Karen from 2024 with preserved EF and no significant valvular abnormality/effusions.    Sporadic episodes of dizziness/numbness and tingling-noted below neck level; noted randomly. At times with falls but not always.   Worse at times with positional changes.  States she almost uses her Bumex every 1 to 2 days and currently on 2 mg daily.  No definite blood pressure correlation.  No syncope; at times with atypical CP-sporadic; sharp with no radiation/associated symptoms and transient.  No exertional component    Past Medical History, Past Surgical History, Family history, Social History, and Medications were all reviewed with the patient today and updated as necessary.     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

## 2024-09-24 ENCOUNTER — HOSPITAL ENCOUNTER (EMERGENCY)
Age: 24
Discharge: HOME | End: 2024-09-24
Attending: EMERGENCY MEDICINE
Payer: COMMERCIAL

## 2024-09-24 ENCOUNTER — APPOINTMENT (OUTPATIENT)
Dept: GENERAL RADIOLOGY | Age: 24
End: 2024-09-24
Payer: COMMERCIAL

## 2024-09-24 VITALS
RESPIRATION RATE: 16 BRPM | DIASTOLIC BLOOD PRESSURE: 87 MMHG | SYSTOLIC BLOOD PRESSURE: 122 MMHG | HEART RATE: 96 BPM | HEIGHT: 65 IN | WEIGHT: 153 LBS | BODY MASS INDEX: 25.49 KG/M2 | TEMPERATURE: 97.9 F | OXYGEN SATURATION: 95 %

## 2024-09-24 DIAGNOSIS — S50.01XA CONTUSION OF RIGHT ELBOW, INITIAL ENCOUNTER: Primary | ICD-10-CM

## 2024-09-24 PROCEDURE — 73080 X-RAY EXAM OF ELBOW: CPT

## 2024-09-24 PROCEDURE — 99283 EMERGENCY DEPT VISIT LOW MDM: CPT

## 2024-09-24 ASSESSMENT — PAIN DESCRIPTION - LOCATION: LOCATION: ARM

## 2024-09-24 ASSESSMENT — ENCOUNTER SYMPTOMS
VOMITING: 0
DIARRHEA: 0

## 2024-09-24 ASSESSMENT — PAIN DESCRIPTION - ORIENTATION: ORIENTATION: RIGHT

## 2024-09-24 ASSESSMENT — PAIN SCALES - GENERAL: PAINLEVEL_OUTOF10: 9

## 2024-09-24 ASSESSMENT — PAIN - FUNCTIONAL ASSESSMENT: PAIN_FUNCTIONAL_ASSESSMENT: 0-10

## 2024-09-25 ENCOUNTER — OFFICE VISIT (OUTPATIENT)
Dept: ORTHOPEDIC SURGERY | Age: 24
End: 2024-09-25
Payer: COMMERCIAL

## 2024-09-25 DIAGNOSIS — M84.372A STRESS FRACTURE OF LEFT ANKLE, INITIAL ENCOUNTER: Primary | ICD-10-CM

## 2024-09-25 PROCEDURE — 99213 OFFICE O/P EST LOW 20 MIN: CPT | Performed by: ORTHOPAEDIC SURGERY

## 2024-12-23 ENCOUNTER — OFFICE VISIT (OUTPATIENT)
Dept: NEUROLOGY | Age: 24
End: 2024-12-23
Payer: COMMERCIAL

## 2024-12-23 VITALS
SYSTOLIC BLOOD PRESSURE: 122 MMHG | HEIGHT: 65 IN | BODY MASS INDEX: 27.32 KG/M2 | WEIGHT: 164 LBS | HEART RATE: 85 BPM | DIASTOLIC BLOOD PRESSURE: 87 MMHG

## 2024-12-23 DIAGNOSIS — G40.909 SEIZURE DISORDER (HCC): Primary | ICD-10-CM

## 2024-12-23 PROCEDURE — 99203 OFFICE O/P NEW LOW 30 MIN: CPT | Performed by: PSYCHIATRY & NEUROLOGY

## 2024-12-23 RX ORDER — LAMOTRIGINE 25 MG/1
50 TABLET ORAL 2 TIMES DAILY
COMMUNITY

## 2024-12-23 ASSESSMENT — ENCOUNTER SYMPTOMS
ABDOMINAL PAIN: 0
COUGH: 0
VOICE CHANGE: 0

## 2024-12-23 NOTE — PROGRESS NOTES
Worried that the place you are staying is making you sick: No       Medications/Allergies:     MEDICATIONS:   Outpatient Encounter Medications as of 12/23/2024   Medication Sig Dispense Refill    lamoTRIgine (LAMICTAL) 25 MG tablet Take 2 tablets by mouth 2 times daily      bumetanide (BUMEX) 2 MG tablet Take 1 tablet by mouth daily      mycophenolate (CELLCEPT) 500 MG tablet TAKE 3 TABLETS (1500 MG) BY MOUTH TWICE A DAY      spironolactone (ALDACTONE) 25 MG tablet Take 1 tablet by mouth daily      Voclosporin (LUPKYNIS) 7.9 MG CAPS Take 23.7 mg by mouth 2 times daily      predniSONE (DELTASONE) 10 MG tablet Take 6 tablets by mouth daily      diclofenac (VOLTAREN) 25 MG EC tablet TAKE 1 TABLET (25 MG) BY MOUTH 3-4 TIMES PER DAY AND AT BEDTIME AS NEEDED--TAKE WITH FOOD      tiZANidine (ZANAFLEX) 2 MG tablet TAKE 1 TABLET BY MOUTH 3 TIMES A DAY AS NEEDED      hydroxychloroquine (PLAQUENIL) 200 MG tablet Take 1 tablet by mouth 2 times daily 60 tablet 12    gabapentin (NEURONTIN) 300 MG capsule Take 3 capsules by mouth 2 times daily.      amitriptyline (ELAVIL) 100 MG tablet Take 1 tablet by mouth       No facility-administered encounter medications on file as of 12/23/2024.     ALLERGIES:  Allergies   Allergen Reactions    Cephalexin Anaphylaxis    Penicillins Rash and Swelling    Doxycycline Hyclate Rash    Niacin Rash       Physical Exam:     /87   Pulse 85   Ht 1.651 m (5' 5\")   Wt 74.4 kg (164 lb)   BMI 27.29 kg/m²     General Exam:  General: Well developed, well nourished, in no apparent distress.   HEENT: Normocephalic, atraumatic. Sclera anicteric. Oropharynx clear.   Neck: Supple without masses  Cardiovascular: Regular rate and rhythm. No carotid bruits.   Lungs: Non-labored breathing.   Abdomen: Soft, nontender, nondistended.   Extremities: +traumatic scarring of the distal right lower extremity with limited ROM at the wrist. No edema and no rashes.     Neurological Exam:     MS/Language/Speech:

## 2025-03-17 ENCOUNTER — TELEPHONE (OUTPATIENT)
Dept: ORTHOPEDIC SURGERY | Age: 25
End: 2025-03-17

## 2025-03-17 NOTE — TELEPHONE ENCOUNTER
Second Request   spoke w/patient/wants 2nd opinion because it seems to fx every year and this has been ongoing for 5 years.  Ok w/JWW  is it ok w/ MLS??  Thanks  
Speaking Coherently

## 2025-03-28 ENCOUNTER — OFFICE VISIT (OUTPATIENT)
Dept: ORTHOPEDIC SURGERY | Age: 25
End: 2025-03-28
Payer: COMMERCIAL

## 2025-03-28 DIAGNOSIS — G89.29 CHRONIC PAIN OF LEFT ANKLE: Primary | ICD-10-CM

## 2025-03-28 DIAGNOSIS — S90.02XA CONTUSION OF LEFT ANKLE, INITIAL ENCOUNTER: ICD-10-CM

## 2025-03-28 DIAGNOSIS — M25.572 LEFT ANKLE PAIN, UNSPECIFIED CHRONICITY: ICD-10-CM

## 2025-03-28 DIAGNOSIS — M84.372A STRESS FRACTURE OF LEFT ANKLE, INITIAL ENCOUNTER: ICD-10-CM

## 2025-03-28 DIAGNOSIS — M19.079 ARTHRITIS OF SUBTALAR JOINT: ICD-10-CM

## 2025-03-28 DIAGNOSIS — M25.572 CHRONIC PAIN OF LEFT ANKLE: Primary | ICD-10-CM

## 2025-03-28 PROCEDURE — 99214 OFFICE O/P EST MOD 30 MIN: CPT | Performed by: PHYSICIAN ASSISTANT

## 2025-03-28 RX ORDER — HYOSCYAMINE SULFATE 0.12 MG/1
0.12 TABLET SUBLINGUAL 3 TIMES DAILY PRN
COMMUNITY
Start: 2025-01-20

## 2025-03-28 RX ORDER — SODIUM, POTASSIUM,MAG SULFATES 17.5-3.13G
SOLUTION, RECONSTITUTED, ORAL ORAL
COMMUNITY
Start: 2025-02-17

## 2025-03-28 RX ORDER — ONDANSETRON 4 MG/1
4 TABLET, ORALLY DISINTEGRATING ORAL 3 TIMES DAILY PRN
COMMUNITY
Start: 2025-01-20

## 2025-03-28 RX ORDER — SUCRALFATE ORAL 1 G/10ML
1 SUSPENSION ORAL
COMMUNITY
Start: 2025-03-11

## 2025-03-28 RX ORDER — DULOXETIN HYDROCHLORIDE 30 MG/1
CAPSULE, DELAYED RELEASE ORAL
COMMUNITY
Start: 2025-03-10

## 2025-03-28 RX ORDER — PREDNISONE 5 MG/1
5 TABLET ORAL DAILY
COMMUNITY
Start: 2025-02-13

## 2025-03-28 RX ORDER — MYCOPHENOLIC ACID 360 MG/1
TABLET, DELAYED RELEASE ORAL
COMMUNITY
Start: 2025-03-19

## 2025-03-28 NOTE — PROGRESS NOTES
fracture or dislocations.  The TMT joints are aligned, there are no signs of neoplastic disease, or chronic disease.  The midtarsal and subtalar joints appear normal.   Impression:  Normal foot

## 2025-04-07 ENCOUNTER — HOSPITAL ENCOUNTER (OUTPATIENT)
Dept: MRI IMAGING | Age: 25
Discharge: HOME | End: 2025-04-10
Attending: ORTHOPAEDIC SURGERY
Payer: COMMERCIAL

## 2025-04-07 DIAGNOSIS — M84.372A STRESS FRACTURE OF LEFT ANKLE, INITIAL ENCOUNTER: ICD-10-CM

## 2025-04-07 DIAGNOSIS — G89.29 CHRONIC PAIN OF LEFT ANKLE: ICD-10-CM

## 2025-04-07 DIAGNOSIS — M25.572 LEFT ANKLE PAIN, UNSPECIFIED CHRONICITY: ICD-10-CM

## 2025-04-07 DIAGNOSIS — M25.572 CHRONIC PAIN OF LEFT ANKLE: ICD-10-CM

## 2025-04-07 PROCEDURE — 73721 MRI JNT OF LWR EXTRE W/O DYE: CPT

## 2025-04-16 ENCOUNTER — TELEPHONE (OUTPATIENT)
Dept: ORTHOPEDIC SURGERY | Age: 25
End: 2025-04-16

## 2025-04-18 ENCOUNTER — TELEPHONE (OUTPATIENT)
Dept: ORTHOPEDIC SURGERY | Age: 25
End: 2025-04-18

## 2025-05-01 ENCOUNTER — OFFICE VISIT (OUTPATIENT)
Dept: ORTHOPEDIC SURGERY | Age: 25
End: 2025-05-01
Payer: COMMERCIAL

## 2025-05-01 DIAGNOSIS — M76.822 POSTERIOR TIBIAL TENDONITIS, LEFT: Primary | ICD-10-CM

## 2025-05-01 PROBLEM — S99.912A LEFT ANKLE INJURY: Status: ACTIVE | Noted: 2025-05-01

## 2025-05-01 PROCEDURE — 99214 OFFICE O/P EST MOD 30 MIN: CPT | Performed by: ORTHOPAEDIC SURGERY

## 2025-05-01 NOTE — PROGRESS NOTES
& Plan  1. Left posterior tibial tendinitis, stage I: Planned arthroscopic debridement of the left posterior tibial tendon.  - Perform arthroscopic debridement of the left posterior tibial tendon.  - Make small incisions to minimize infection risk.  - Clean tendon sheath.  - Post-surgery: apply small dressing and boot to limit ankle movement.  - Use crutches or knee scooter for mobility.  - Avoid walking on foot for 2 weeks.  - After 2 weeks: remove dressing and stitches.  - Start physical therapy.    2. Lupus:   - Obtain letter from rheumatologist to ensure surgery safety and manage medications.  - Post-surgery: start aspirin to prevent blood clots.    Follow-up  - Appointment with rheumatologist on 05/01/2025 at 2:20 PM.      I had a thoroughly informed the patient of the plan for treatment.    I discussed non operative treatment initially.  We discussed Ankle: ASO brace, [place] Brace, Injections, oral NSAIDS, activity modificaitons and physical therapy.  At this point Non surgical treatment has not relieved the patient symptoms    We discussed multiple surgical options.  We discussed surgery to be open debridement versus arthroscopic debridement.  With her lupus and wound healing issues I do think arthroscopic debridement is the best option.  I discussed with her multiple small incisions, arthroscopic debridement needed showed her some papers.  After discussion of arthroscopic debridement of the posterior tibial tendon she wishes to discuss it more..   I discussed with this patient  the risk associated with the outlined surgery.  These risk include infection, delayed wound healing, hardware failure, painful hardware, recurring wounds, recurring pain, damage to surrounding structures such as nerves, vessels, tendons, ligaments, and joints.  I discussed how no surgery is perfect and this surgery may not be successful.  There is also risk of anesthesia such as nerve damage from local anesthetic, damage to the

## 2025-06-05 ENCOUNTER — OFFICE VISIT (OUTPATIENT)
Dept: ORTHOPEDIC SURGERY | Age: 25
End: 2025-06-05

## 2025-06-05 DIAGNOSIS — M76.822 POSTERIOR TIBIAL TENDONITIS, LEFT: Primary | ICD-10-CM

## 2025-06-05 PROCEDURE — 99024 POSTOP FOLLOW-UP VISIT: CPT | Performed by: ORTHOPAEDIC SURGERY

## 2025-06-05 NOTE — PROGRESS NOTES
Name: Carlyn Crystal  YOB: 2000  Gender: female  MRN: [id number]    Treatment Plan:   I had a thorough discussion with the patient regarding the treatment plan   History of vascularized fibular graft in 2011  Peroneal tendonitis and PTTD  Membranous lupus glomerulonephritis    MRI confirms left posterior tibial tendon inflammation at the level of the ankle joint.  Consistent with stage I posterior tibial tendon dysfunction with no signs of arch collapse    Consent to read: Left posterior tibial tendon arthroscopic tenosynovectomy and debridement    Ankle block with potential general anesthesia -postop no splint.     CC: left ankle pain    HPI: Carlyn Crystal is a 25 y.o. female presents with chronic left ankle pain. Patient has a history of vascularized fibular graft in 2011 with syndesmosis fusion.  She is seen Dr. Olsen in the past and been treated for multiple stress fractures in her ankle.  She presents today as a second opinion to see if there is anything that can be done to prevent future problems.  She notes that every couple of months she will have increased pain, particularly in the lateral ankle and be diagnosed with a stress fracture and placed into a boot for 6 weeks and then will improve and be fine for couple months before the next cycle.  She has a past medical history of membranous lupus glomerulonephritis and is on several immunosuppressant drugs.    5/1/25-still having pain and swelling over the medial ankle specifically around the posterior tibial tendon region.  She points directly over it.  She has obtained the MRI today stating that the MRIs been finished.  She states that her glucose has been the best has ever done and she is having a lot less overall symptoms and not near as systemically symptomatic.  She is very pleased with her current medication regimen she states the only issue she really having the medial ankle is    ROS:  Patient Denies fever/chills, headache,